# Patient Record
Sex: MALE | Race: WHITE | Employment: STUDENT | URBAN - METROPOLITAN AREA
[De-identification: names, ages, dates, MRNs, and addresses within clinical notes are randomized per-mention and may not be internally consistent; named-entity substitution may affect disease eponyms.]

---

## 2019-05-25 ENCOUNTER — HOSPITAL ENCOUNTER (EMERGENCY)
Age: 19
Discharge: HOME | End: 2019-05-26
Attending: PEDIATRICS | Admitting: PEDIATRICS
Payer: COMMERCIAL

## 2019-05-25 ENCOUNTER — APPOINTMENT (OUTPATIENT)
Dept: CT IMAGING | Age: 19
End: 2019-05-25
Attending: PEDIATRICS
Payer: COMMERCIAL

## 2019-05-25 DIAGNOSIS — R50.9 FEVER, UNSPECIFIED FEVER CAUSE: ICD-10-CM

## 2019-05-25 DIAGNOSIS — R11.10 VOMITING, INTRACTABILITY OF VOMITING NOT SPECIFIED, PRESENCE OF NAUSEA NOT SPECIFIED, UNSPECIFIED VOMITING TYPE: ICD-10-CM

## 2019-05-25 DIAGNOSIS — R10.31 ABDOMINAL PAIN, RIGHT LOWER QUADRANT: Primary | ICD-10-CM

## 2019-05-25 LAB
ALBUMIN SERPL-MCNC: 4.4 G/DL (ref 3.5–5)
ALBUMIN/GLOB SERPL: 1.2 {RATIO} (ref 1.1–2.2)
ALP SERPL-CCNC: 124 U/L (ref 45–117)
ALT SERPL-CCNC: 53 U/L (ref 12–78)
AMPHET UR QL SCN: NEGATIVE
ANION GAP SERPL CALC-SCNC: 7 MMOL/L (ref 5–15)
APPEARANCE UR: CLEAR
AST SERPL-CCNC: 26 U/L (ref 15–37)
BACTERIA URNS QL MICRO: NEGATIVE /HPF
BARBITURATES UR QL SCN: NEGATIVE
BASOPHILS # BLD: 0 K/UL (ref 0–0.1)
BASOPHILS NFR BLD: 0 % (ref 0–1)
BENZODIAZ UR QL: NEGATIVE
BILIRUB SERPL-MCNC: 0.6 MG/DL (ref 0.2–1)
BILIRUB UR QL: NEGATIVE
BUN SERPL-MCNC: 14 MG/DL (ref 6–20)
BUN/CREAT SERPL: 13 (ref 12–20)
CALCIUM SERPL-MCNC: 9.4 MG/DL (ref 8.5–10.1)
CANNABINOIDS UR QL SCN: NEGATIVE
CHLORIDE SERPL-SCNC: 107 MMOL/L (ref 97–108)
CO2 SERPL-SCNC: 24 MMOL/L (ref 21–32)
COCAINE UR QL SCN: NEGATIVE
COLOR UR: NORMAL
COMMENT, HOLDF: NORMAL
CREAT SERPL-MCNC: 1.06 MG/DL (ref 0.7–1.3)
CRP SERPL-MCNC: <0.29 MG/DL (ref 0–0.6)
DIFFERENTIAL METHOD BLD: ABNORMAL
DRUG SCRN COMMENT,DRGCM: ABNORMAL
EOSINOPHIL # BLD: 0 K/UL (ref 0–0.4)
EOSINOPHIL NFR BLD: 0 % (ref 0–7)
EPITH CASTS URNS QL MICRO: NORMAL /LPF
ERYTHROCYTE [DISTWIDTH] IN BLOOD BY AUTOMATED COUNT: 14 % (ref 11.5–14.5)
GLOBULIN SER CALC-MCNC: 3.8 G/DL (ref 2–4)
GLUCOSE SERPL-MCNC: 100 MG/DL (ref 65–100)
GLUCOSE UR STRIP.AUTO-MCNC: NEGATIVE MG/DL
HCT VFR BLD AUTO: 48.8 % (ref 36.6–50.3)
HGB BLD-MCNC: 15.6 G/DL (ref 12.1–17)
HGB UR QL STRIP: NEGATIVE
HYALINE CASTS URNS QL MICRO: NORMAL /LPF (ref 0–5)
IMM GRANULOCYTES # BLD AUTO: 0 K/UL
IMM GRANULOCYTES NFR BLD AUTO: 0 %
KETONES UR QL STRIP.AUTO: NEGATIVE MG/DL
LEUKOCYTE ESTERASE UR QL STRIP.AUTO: NEGATIVE
LIPASE SERPL-CCNC: 159 U/L (ref 73–393)
LYMPHOCYTES # BLD: 0.4 K/UL (ref 0.8–3.5)
LYMPHOCYTES NFR BLD: 3 % (ref 12–49)
MCH RBC QN AUTO: 28.5 PG (ref 26–34)
MCHC RBC AUTO-ENTMCNC: 32 G/DL (ref 30–36.5)
MCV RBC AUTO: 89.1 FL (ref 80–99)
METHADONE UR QL: NEGATIVE
MONOCYTES # BLD: 0.4 K/UL (ref 0–1)
MONOCYTES NFR BLD: 3 % (ref 5–13)
NEUTS BAND NFR BLD MANUAL: 2 % (ref 0–6)
NEUTS SEG # BLD: 11.4 K/UL (ref 1.8–8)
NEUTS SEG NFR BLD: 92 % (ref 32–75)
NITRITE UR QL STRIP.AUTO: NEGATIVE
NRBC # BLD: 0 K/UL (ref 0–0.01)
NRBC BLD-RTO: 0 PER 100 WBC
OPIATES UR QL: POSITIVE
PCP UR QL: NEGATIVE
PH UR STRIP: 5 [PH] (ref 5–8)
PLATELET # BLD AUTO: 245 K/UL (ref 150–400)
PMV BLD AUTO: 8.8 FL (ref 8.9–12.9)
POTASSIUM SERPL-SCNC: 3.9 MMOL/L (ref 3.5–5.1)
PROT SERPL-MCNC: 8.2 G/DL (ref 6.4–8.2)
PROT UR STRIP-MCNC: NEGATIVE MG/DL
RBC # BLD AUTO: 5.48 M/UL (ref 4.1–5.7)
RBC #/AREA URNS HPF: NORMAL /HPF (ref 0–5)
RBC MORPH BLD: ABNORMAL
SAMPLES BEING HELD,HOLD: NORMAL
SODIUM SERPL-SCNC: 138 MMOL/L (ref 136–145)
SP GR UR REFRACTOMETRY: 1.02 (ref 1–1.03)
UR CULT HOLD, URHOLD: NORMAL
UROBILINOGEN UR QL STRIP.AUTO: 0.2 EU/DL (ref 0.2–1)
WBC # BLD AUTO: 12.2 K/UL (ref 4.1–11.1)
WBC URNS QL MICRO: NORMAL /HPF (ref 0–4)

## 2019-05-25 PROCEDURE — 96361 HYDRATE IV INFUSION ADD-ON: CPT

## 2019-05-25 PROCEDURE — 74011250637 HC RX REV CODE- 250/637

## 2019-05-25 PROCEDURE — 74011636320 HC RX REV CODE- 636/320: Performed by: RADIOLOGY

## 2019-05-25 PROCEDURE — 74011000258 HC RX REV CODE- 258: Performed by: RADIOLOGY

## 2019-05-25 PROCEDURE — 80307 DRUG TEST PRSMV CHEM ANLYZR: CPT

## 2019-05-25 PROCEDURE — 86140 C-REACTIVE PROTEIN: CPT

## 2019-05-25 PROCEDURE — 83690 ASSAY OF LIPASE: CPT

## 2019-05-25 PROCEDURE — 74177 CT ABD & PELVIS W/CONTRAST: CPT

## 2019-05-25 PROCEDURE — 85025 COMPLETE CBC W/AUTO DIFF WBC: CPT

## 2019-05-25 PROCEDURE — 36415 COLL VENOUS BLD VENIPUNCTURE: CPT

## 2019-05-25 PROCEDURE — 81001 URINALYSIS AUTO W/SCOPE: CPT

## 2019-05-25 PROCEDURE — 99284 EMERGENCY DEPT VISIT MOD MDM: CPT

## 2019-05-25 PROCEDURE — 80053 COMPREHEN METABOLIC PANEL: CPT

## 2019-05-25 PROCEDURE — 96374 THER/PROPH/DIAG INJ IV PUSH: CPT

## 2019-05-25 PROCEDURE — 96375 TX/PRO/DX INJ NEW DRUG ADDON: CPT

## 2019-05-25 PROCEDURE — 74011250636 HC RX REV CODE- 250/636: Performed by: PEDIATRICS

## 2019-05-25 RX ORDER — MORPHINE SULFATE 2 MG/ML
4 INJECTION, SOLUTION INTRAMUSCULAR; INTRAVENOUS
Status: COMPLETED | OUTPATIENT
Start: 2019-05-25 | End: 2019-05-25

## 2019-05-25 RX ORDER — ACETAMINOPHEN 500 MG
TABLET ORAL
Status: COMPLETED
Start: 2019-05-25 | End: 2019-05-25

## 2019-05-25 RX ORDER — ACETAMINOPHEN 500 MG
1000 TABLET ORAL
Status: COMPLETED | OUTPATIENT
Start: 2019-05-26 | End: 2019-05-25

## 2019-05-25 RX ORDER — ONDANSETRON 2 MG/ML
8 INJECTION INTRAMUSCULAR; INTRAVENOUS
Status: COMPLETED | OUTPATIENT
Start: 2019-05-25 | End: 2019-05-25

## 2019-05-25 RX ORDER — SODIUM CHLORIDE 0.9 % (FLUSH) 0.9 %
10 SYRINGE (ML) INJECTION
Status: COMPLETED | OUTPATIENT
Start: 2019-05-25 | End: 2019-05-25

## 2019-05-25 RX ADMIN — ACETAMINOPHEN 1000 MG: 500 TABLET ORAL at 23:48

## 2019-05-25 RX ADMIN — SODIUM CHLORIDE 1000 ML: 900 INJECTION, SOLUTION INTRAVENOUS at 22:31

## 2019-05-25 RX ADMIN — SODIUM CHLORIDE 100 ML: 900 INJECTION, SOLUTION INTRAVENOUS at 21:48

## 2019-05-25 RX ADMIN — MORPHINE SULFATE 4 MG: 2 INJECTION, SOLUTION INTRAMUSCULAR; INTRAVENOUS at 20:30

## 2019-05-25 RX ADMIN — Medication 10 ML: at 21:48

## 2019-05-25 RX ADMIN — SODIUM CHLORIDE 1000 ML: 900 INJECTION, SOLUTION INTRAVENOUS at 20:17

## 2019-05-25 RX ADMIN — ONDANSETRON 8 MG: 2 INJECTION INTRAMUSCULAR; INTRAVENOUS at 20:17

## 2019-05-25 RX ADMIN — Medication 1000 MG: at 23:48

## 2019-05-25 RX ADMIN — IOPAMIDOL 100 ML: 755 INJECTION, SOLUTION INTRAVENOUS at 21:48

## 2019-05-26 VITALS
BODY MASS INDEX: 25.57 KG/M2 | OXYGEN SATURATION: 100 % | RESPIRATION RATE: 18 BRPM | TEMPERATURE: 99.5 F | SYSTOLIC BLOOD PRESSURE: 125 MMHG | DIASTOLIC BLOOD PRESSURE: 51 MMHG | HEART RATE: 86 BPM | WEIGHT: 172.62 LBS | HEIGHT: 69 IN

## 2019-05-26 LAB
COMMENT, HOLDF: NORMAL
LACTATE SERPL-SCNC: 1.4 MMOL/L (ref 0.4–2)
SAMPLES BEING HELD,HOLD: NORMAL

## 2019-05-26 PROCEDURE — 87040 BLOOD CULTURE FOR BACTERIA: CPT

## 2019-05-26 PROCEDURE — 83605 ASSAY OF LACTIC ACID: CPT

## 2019-05-26 PROCEDURE — 36415 COLL VENOUS BLD VENIPUNCTURE: CPT

## 2019-05-26 RX ORDER — IBUPROFEN 600 MG/1
600 TABLET ORAL
Qty: 20 TAB | Refills: 0 | Status: SHIPPED | OUTPATIENT
Start: 2019-05-26

## 2019-05-26 RX ORDER — ONDANSETRON 8 MG/1
8 TABLET, ORALLY DISINTEGRATING ORAL
Qty: 6 TAB | Refills: 0 | Status: SHIPPED | OUTPATIENT
Start: 2019-05-26

## 2019-05-26 RX ORDER — ACETAMINOPHEN 325 MG/1
650 TABLET ORAL
Qty: 20 TAB | Refills: 0 | Status: SHIPPED | OUTPATIENT
Start: 2019-05-26

## 2019-05-26 NOTE — ED NOTES
Pt resting comfortably on stretcher. Skin pink, dry and warm. No vomiting.  No complaints at this time

## 2019-05-26 NOTE — ED PROVIDER NOTES
HPI   Decision-making:    Patient is a 13-year-old male who presents to the emergency department with right lower quadrant abdominal pain and vomiting. Patient states he was in his usual state of good health has been on vacation with friends in [place] and currently driving back to [place]. He states beginning this morning at 8 AM he has had vomiting with increased frequency over the last 3-4 hours. Last oral intake was at 3 PM positive anorexia no multiple emesis too numerous to count nonbloody nonbilious. Started with right lower quadrant abdominal pain which is increased in intensity over the last 6 hours. No known fevers. No headache no sore throat no cough no chest pain. No dysuria or testicular pain. No penile discharges no hematuria. No trauma. Of note patient states she was diagnosed with appendicitis 3 years earlier and treated with antibiotics the appendix was never removed. He was told that should his pain returned appendectomy with be required. No other complaints no modifying factors no other concerns    Review of systems: Complete 10point  review was conducted. All pertinent positives and negatives are as stated in history of present illness  Allergies: Penicillins  Immunizations: Up to date  Medications: Claritin  Past medical history: Unremarkable except as described above  Family history: Noncontributory to this visit  Social history: Attends college. Denies smoking or drug use. Positive alcohol use      History reviewed. No pertinent past medical history. Past Surgical History:   Procedure Laterality Date    HX ACL RECONSTRUCTION Left          History reviewed. No pertinent family history.     Social History     Socioeconomic History    Marital status: SINGLE     Spouse name: Not on file    Number of children: Not on file    Years of education: Not on file    Highest education level: Not on file   Occupational History    Not on file   Social Needs    Financial resource strain: Not on file    Food insecurity:     Worry: Not on file     Inability: Not on file    Transportation needs:     Medical: Not on file     Non-medical: Not on file   Tobacco Use    Smoking status: Never Smoker    Smokeless tobacco: Never Used   Substance and Sexual Activity    Alcohol use: Yes    Drug use: Not Currently    Sexual activity: Not on file   Lifestyle    Physical activity:     Days per week: Not on file     Minutes per session: Not on file    Stress: Not on file   Relationships    Social connections:     Talks on phone: Not on file     Gets together: Not on file     Attends [employer] service: Not on file     Active member of club or organization: Not on file     Attends meetings of clubs or organizations: Not on file     Relationship status: Not on file    Intimate partner violence:     Fear of current or ex partner: Not on file     Emotionally abused: Not on file     Physically abused: Not on file     Forced sexual activity: Not on file   Other Topics Concern    Not on file   Social History Narrative    Not on file         ALLERGIES: Penicillins    Review of Systems   Constitutional: Positive for appetite change and fever. Negative for activity change. HENT: Negative for ear pain and sore throat. Eyes: Negative for pain, discharge, redness and visual disturbance. Respiratory: Negative for cough and shortness of breath. Cardiovascular: Negative for chest pain. Gastrointestinal: Positive for abdominal pain, nausea and vomiting. Negative for blood in stool, constipation and diarrhea. Genitourinary: Negative for decreased urine volume and dysuria. Musculoskeletal: Negative for gait problem and neck pain. Skin: Negative for rash. Neurological: Negative for dizziness and weakness. All other systems reviewed and are negative.       Vitals:    05/25/19 2001   BP: 128/69   Pulse: 98   Resp: 18   Temp: 98.5 °F (36.9 °C)   SpO2: 100%   Weight: 78.3 kg (172 lb 9.9 oz)   Height: 5' 9\" (1.753 m)            Physical Exam   Nursing note and vitals reviewed. PE:  GEN:  WDWN male alert non toxic in NAD actively vomiting  SK: CRT < 2 sec, good distal pulses. No lesions, no rashes, no petechiae  HEENT: H: AT/NC. E: EOMI , PERRL, E: TM clear  N/T: Clear oropharynx  NECK: supple, no meningismus. No pain on palpation  Chest: Clear to auscultation, clear BS. NO rales, rhonchi, wheezes or distress. No   Retraction. Chest Wall: no tenderness on palpation  CV: Regular rate and rhythm. Normal S1 S2 . No murmur, gallops or thrills  ABD: Soft + tenderness in RLQ no hepatomegaly, good bowel sound, +guarding, No masses,  MS: FROM all extremities, no long bone tenderness. No swelling, cyanosis, no edema. Good distal pulses. Gait normal  NEURO: Alert. No focality. Cranial nerves 2-12 grossly intact. GCS 15  Behavior and mentation appropriate for age           MDM  Number of Diagnoses or Management Options  Abdominal pain, right lower quadrant:   Fever, unspecified fever cause:   Vomiting, intractability of vomiting not specified, presence of nausea not specified, unspecified vomiting type:   Diagnosis management comments: Medical decision making:    Differential diagnosis includes: Appendicitis, acute gastroenteritis, UTI renal stone    Patient with history of appendicitis 3 years earlier treated with antibiotics only  CBC: CBC 12.2 hemoglobin 15.6 normal platelets differential 92 segs 2 bands 3 lymphs  Urinalysis: Unremarkable  CMP: Sodium 138 potassium 3.9 chloride 107 bicarbonate 24 glucose 100 BUN 14 creatinine 1.06 ALT 53 AST 26 lipase 159  CRP: Less than 0.29  Urine drug screen: Positive for opiates-patient received morphine in the ER prior to this being performed  CT abdomen pelvis: No acute process appendix unremarkable    Patient received 4 mg of morphine on arrival secondary to abdominal pain 8 mg of Zofran and normal saline bolus.  On repeat exam he is markedly improved states she feels much better  Attempt at p.o. challenge cells complaints of nausea    Spiked temperature to 101.2 Tylenol given    Second normal saline bolus administered  Lactate pending    Patient has drank 20 ounces of Gatorade tea and crackers P. fall in a sling. While waiting for her mother to calm the patient    Patient sent over to Dr. Marquis Shea at 0000. To check lactate and reevaluate the patient prior to discharge    Patient's results have been reviewed with them. Patient and /or family have verbally conveyed understanding and agreement of the patient's signs, symptoms, diagnosis, treatment and prognosis and additionally agree to follow up as recommended or return to the Emergency Department should their condition change prior to follow-up. Discharge instructions have also been provided to the patient with some educational information regarding their diagnosis as well as a list of reasons why they would want to return to the ER prior to their follow-up appointment should their condition change.     Clinical impression:  Abdominal pain  Vomiting  Fever       Amount and/or Complexity of Data Reviewed  Clinical lab tests: ordered and reviewed  Tests in the radiology section of CPT®: ordered and reviewed  Independent visualization of images, tracings, or specimens: yes           Procedures

## 2019-05-26 NOTE — ED NOTES
Pt sleeping on stretcher. Skin pink, dry and warm. Abdomen soft and non tender. Bowel sounds active.

## 2019-05-26 NOTE — DISCHARGE INSTRUCTIONS
Follow up with your physician in 1 day for re-evaluation. Return to the emergency department for any worsening symptoms, any trouble breathing, fevers lasting longer than 3 days, persistent vomiting, return of pain or other new concerns. Abdominal Pain: Care Instructions  Your Care Instructions    Abdominal pain has many possible causes. Some aren't serious and get better on their own in a few days. Others need more testing and treatment. If your pain continues or gets worse, you need to be rechecked and may need more tests to find out what is wrong. You may need surgery to correct the problem. Don't ignore new symptoms, such as fever, nausea and vomiting, urination problems, pain that gets worse, and dizziness. These may be signs of a more serious problem. Your doctor may have recommended a follow-up visit in the next 8 to 12 hours. If you are not getting better, you may need more tests or treatment. The doctor has checked you carefully, but problems can develop later. If you notice any problems or new symptoms, get medical treatment right away. Follow-up care is a key part of your treatment and safety. Be sure to make and go to all appointments, and call your doctor if you are having problems. It's also a good idea to know your test results and keep a list of the medicines you take. How can you care for yourself at home? · Rest until you feel better. · To prevent dehydration, drink plenty of fluids, enough so that your urine is light yellow or clear like water. Choose water and other caffeine-free clear liquids until you feel better. If you have kidney, heart, or liver disease and have to limit fluids, talk with your doctor before you increase the amount of fluids you drink. · If your stomach is upset, eat mild foods, such as rice, dry toast or crackers, bananas, and applesauce. Try eating several small meals instead of two or three large ones.   · Wait until 48 hours after all symptoms have gone away before you have spicy foods, alcohol, and drinks that contain caffeine. · Do not eat foods that are high in fat. · Avoid anti-inflammatory medicines such as aspirin, ibuprofen (Advil, Motrin), and naproxen (Aleve). These can cause stomach upset. Talk to your doctor if you take daily aspirin for another health problem. When should you call for help? Call 911 anytime you think you may need emergency care. For example, call if:    · You passed out (lost consciousness).     · You pass maroon or very bloody stools.     · You vomit blood or what looks like coffee grounds.     · You have new, severe belly pain.    Call your doctor now or seek immediate medical care if:    · Your pain gets worse, especially if it becomes focused in one area of your belly.     · You have a new or higher fever.     · Your stools are black and look like tar, or they have streaks of blood.     · You have unexpected vaginal bleeding.     · You have symptoms of a urinary tract infection. These may include:  ? Pain when you urinate. ? Urinating more often than usual.  ? Blood in your urine.     · You are dizzy or lightheaded, or you feel like you may faint.    Watch closely for changes in your health, and be sure to contact your doctor if:    · You are not getting better after 1 day (24 hours). Where can you learn more? Go to http://hallie-darniel.info/. Enter C485 in the search box to learn more about \"Abdominal Pain: Care Instructions. \"  Current as of: September 23, 2018  Content Version: 11.9  © 8158-8682 [employer]. Care instructions adapted under license by [employer] (which disclaims liability or warranty for this information). If you have questions about a medical condition or this instruction, always ask your healthcare professional. [place] [address] any warranty or liability for your use of this information.          Patient Education        Nausea and Vomiting: Care Instructions  Your Care Instructions    When you are nauseated, you may feel weak and sweaty and notice a lot of saliva in your mouth. Nausea often leads to vomiting. Most of the time you do not need to worry about nausea and vomiting, but they can be signs of other illnesses. Two common causes of nausea and vomiting are stomach flu and food poisoning. Nausea and vomiting from viral stomach flu will usually start to improve within 24 hours. Nausea and vomiting from food poisoning may last from 12 to 48 hours. The doctor has checked you carefully, but problems can develop later. If you notice any problems or new symptoms, get medical treatment right away. Follow-up care is a key part of your treatment and safety. Be sure to make and go to all appointments, and call your doctor if you are having problems. It's also a good idea to know your test results and keep a list of the medicines you take. How can you care for yourself at home? · To prevent dehydration, drink plenty of fluids, enough so that your urine is light yellow or clear like water. Choose water and other caffeine-free clear liquids until you feel better. If you have kidney, heart, or liver disease and have to limit fluids, talk with your doctor before you increase the amount of fluids you drink. · Rest in bed until you feel better. · When you are able to eat, try clear soups, mild foods, and liquids until all symptoms are gone for 12 to 48 hours. Other good choices include dry toast, crackers, cooked cereal, and gelatin dessert, such as Jell-O. When should you call for help? Call 911 anytime you think you may need emergency care. For example, call if:    · You passed out (lost consciousness).    Call your doctor now or seek immediate medical care if:    · You have symptoms of dehydration, such as:  ? Dry eyes and a dry mouth. ? Passing only a little dark urine. ?  Feeling thirstier than usual.     · You have new or worsening belly pain.     · You have a new or higher fever.     · You vomit blood or what looks like coffee grounds.    Watch closely for changes in your health, and be sure to contact your doctor if:    · You have ongoing nausea and vomiting.     · Your vomiting is getting worse.     · Your vomiting lasts longer than 2 days.     · You are not getting better as expected. Where can you learn more? Go to http://hallie-darinel.info/. Enter 25 [id number] in the search box to learn more about \"Nausea and Vomiting: Care Instructions. \"  Current as of: September 23, 2018  Content Version: 11.9  © 6913-9569 [employer]. Care instructions adapted under license by [employer] (which disclaims liability or warranty for this information). If you have questions about a medical condition or this instruction, always ask your healthcare professional. [address] any warranty or liability for your use of this information. Patient Education        Learning About Fever  What is a fever? A fever is a high body temperature. It's one way your body fights being sick. A fever shows that the body is responding to infection or other illnesses, both minor and severe. A fever is a symptom, not an illness by itself. A fever can be a sign that you are ill, but most fevers are not caused by a serious problem. You may have a fever with a minor illness, such as a cold. But sometimes a very serious infection may cause little or no fever. It is important to look at other symptoms, other conditions you have, and how you feel in general. In children, notice how they act and see what symptoms they complain of. What is a normal body temperature? A normal body temperature is about 98. 6ºF. Some people have a normal temperature that is a little higher or a little lower than this. Your temperature may be a little lower in the morning than it is later in the day.  It may go up during hot weather or when you exercise, wear heavy clothes, or take a hot bath. Your temperature may also be different depending on how you take it. A temperature taken in the mouth (oral) or under the arm may be a little lower than your core temperature (rectal). What is a fever temperature? A core temperature of 100.4°F or above is considered a fever. What can cause a fever? A fever may be caused by:  · Infections. This is the most common cause of a fever. Examples of infections that can cause a fever include the flu, a kidney infection, or pneumonia. · Some medicines. · Severe trauma or injury, such as a heart attack, stroke, heatstroke, or burns. · Other medical conditions, such as arthritis and some cancers. How can you treat a fever at home? · Ask your doctor if you can take an over-the-counter pain medicine, such as acetaminophen (Tylenol), ibuprofen (Advil, Motrin), or naproxen (Aleve). Be safe with medicines. Read and follow all instructions on the label. · To prevent dehydration, drink plenty of fluids. Choose water and other caffeine-free clear liquids until you feel better. If you have kidney, heart, or liver disease and have to limit fluids, talk with your doctor before you increase the amount of fluids you drink. Follow-up care is a key part of your treatment and safety. Be sure to make and go to all appointments, and call your doctor if you are having problems. It's also a good idea to know your test results and keep a list of the medicines you take. Where can you learn more? Go to http://hallie-darinel.info/. Enter K715 in the search box to learn more about \"Learning About Fever. \"  Current as of: September 23, 2018  Content Version: 11.9  © 4623-4470 [employer]. Care instructions adapted under license by [employer] (which disclaims liability or warranty for this information).  If you have questions about a medical condition or this instruction, always ask your healthcare professional. [place] [address] any warranty or liability for your use of this information. We hope that we have addressed all of your medical concerns. The examination and treatment you received in the Emergency Department were for an emergent problem and were not intended as complete care. It is important that you follow up with your healthcare provider(s) for ongoing care. If your symptoms worsen or do not improve as expected, and you are unable to reach your usual health care provider(s), you should return to the Emergency Department. Today's healthcare is undergoing tremendous change, and patient satisfaction surveys are one of the many tools to assess the quality of medical care. You may receive a survey from the [employer] regarding your experience in the Emergency Department. I hope that your experience has been completely positive, particularly the medical care that I provided. As such, please participate in the survey; anything less than excellent does not meet my expectations or intentions. [facility] and [address] [place] [address] participate in nationally recognized quality of care measures. If your blood pressure is greater than 120/80, as reported below, we urge that you seek medical care to address the potential of high blood pressure, commonly known as hypertension. Hypertension can be hereditary or can be caused by certain medical conditions, pain, stress, or \"white coat syndrome. \"       Please make an appointment with your health care provider(s) for follow up of your Emergency Department visit. VITALS:   Patient Vitals for the past 8 hrs:   Temp Pulse Resp BP SpO2   05/25/19 2341 (!) 101.2 °F (38.4 °C) (!) 104 18 125/51 98 %   05/25/19 2001 98.5 °F (36.9 °C) 98 18 128/69 100 %          Thank you for allowing us to provide you with medical care today.   We realize that you have many choices for your emergency care needs. Please choose us in the future for any continued health care needs. Dell Huang MD    [address] [facility].   Office: [phone]            Recent Results (from the past 24 hour(s))   SAMPLES BEING HELD    Collection Time: 05/25/19  8:17 PM   Result Value Ref Range    SAMPLES BEING HELD 2RED, 2LAV, 2PST     COMMENT        Add-on orders for these samples will be processed based on acceptable specimen integrity and analyte stability, which may vary by analyte. C REACTIVE PROTEIN, QT    Collection Time: 05/25/19  8:21 PM   Result Value Ref Range    C-Reactive protein <0.29 0.00 - 0.60 mg/dL   CBC WITH AUTOMATED DIFF    Collection Time: 05/25/19  8:21 PM   Result Value Ref Range    WBC 12.2 (H) 4.1 - 11.1 K/uL    RBC 5.48 4.10 - 5.70 M/uL    HGB 15.6 12.1 - 17.0 g/dL    HCT 48.8 36.6 - 50.3 %    MCV 89.1 80.0 - 99.0 FL    MCH 28.5 26.0 - 34.0 PG    MCHC 32.0 30.0 - 36.5 g/dL    RDW 14.0 11.5 - 14.5 %    PLATELET 877 752 - 239 K/uL    MPV 8.8 (L) 8.9 - 12.9 FL    NRBC 0.0 0  WBC    ABSOLUTE NRBC 0.00 0.00 - 0.01 K/uL    NEUTROPHILS 92 (H) 32 - 75 %    BAND NEUTROPHILS 2 0 - 6 %    LYMPHOCYTES 3 (L) 12 - 49 %    MONOCYTES 3 (L) 5 - 13 %    EOSINOPHILS 0 0 - 7 %    BASOPHILS 0 0 - 1 %    IMMATURE GRANULOCYTES 0 %    ABS. NEUTROPHILS 11.4 (H) 1.8 - 8.0 K/UL    ABS. LYMPHOCYTES 0.4 (L) 0.8 - 3.5 K/UL    ABS. MONOCYTES 0.4 0.0 - 1.0 K/UL    ABS. EOSINOPHILS 0.0 0.0 - 0.4 K/UL    ABS. BASOPHILS 0.0 0.0 - 0.1 K/UL    ABS. IMM.  GRANS. 0.0 K/UL    DF MANUAL      RBC COMMENTS NORMOCYTIC, NORMOCHROMIC     LIPASE    Collection Time: 05/25/19  8:21 PM   Result Value Ref Range    Lipase 159 73 - 147 U/L   METABOLIC PANEL, COMPREHENSIVE    Collection Time: 05/25/19  8:21 PM   Result Value Ref Range    Sodium 138 136 - 145 mmol/L    Potassium 3.9 3.5 - 5.1 mmol/L    Chloride 107 97 - 108 mmol/L    CO2 24 21 - 32 mmol/L    Anion gap 7 5 - 15 mmol/L    Glucose 100 65 - 100 mg/dL    BUN 14 6 - 20 MG/DL    Creatinine 1.06 0.70 - 1.30 MG/DL    BUN/Creatinine ratio 13 12 - 20      GFR est AA >60 >60 ml/min/1.73m2    GFR est non-AA >60 >60 ml/min/1.73m2    Calcium 9.4 8.5 - 10.1 MG/DL    Bilirubin, total 0.6 0.2 - 1.0 MG/DL    ALT (SGPT) 53 12 - 78 U/L    AST (SGOT) 26 15 - 37 U/L    Alk. phosphatase 124 (H) 45 - 117 U/L    Protein, total 8.2 6.4 - 8.2 g/dL    Albumin 4.4 3.5 - 5.0 g/dL    Globulin 3.8 2.0 - 4.0 g/dL    A-G Ratio 1.2 1.1 - 2.2     URINALYSIS W/MICROSCOPIC    Collection Time: 05/25/19 10:00 PM   Result Value Ref Range    Color YELLOW/STRAW      Appearance CLEAR CLEAR      Specific gravity 1.020 1.003 - 1.030      pH (UA) 5.0 5.0 - 8.0      Protein NEGATIVE  NEG mg/dL    Glucose NEGATIVE  NEG mg/dL    Ketone NEGATIVE  NEG mg/dL    Bilirubin NEGATIVE  NEG      Blood NEGATIVE  NEG      Urobilinogen 0.2 0.2 - 1.0 EU/dL    Nitrites NEGATIVE  NEG      Leukocyte Esterase NEGATIVE  NEG      WBC 0-4 0 - 4 /hpf    RBC 0-5 0 - 5 /hpf    Epithelial cells FEW FEW /lpf    Bacteria NEGATIVE  NEG /hpf    Hyaline cast 0-2 0 - 5 /lpf   URINE CULTURE HOLD SAMPLE    Collection Time: 05/25/19 10:00 PM   Result Value Ref Range    Urine culture hold        URINE ON HOLD IN MICROBIOLOGY DEPT FOR 3 DAYS. IF UNPRESERVED URINE IS SUBMITTED, IT CANNOT BE USED FOR ADDITIONAL TESTING AFTER 24 HRS, RECOLLECTION WILL BE REQUIRED.    DRUG SCREEN, URINE    Collection Time: 05/25/19 10:00 PM   Result Value Ref Range    AMPHETAMINES NEGATIVE  NEG      BARBITURATES NEGATIVE  NEG      BENZODIAZEPINES NEGATIVE  NEG      COCAINE NEGATIVE  NEG      METHADONE NEGATIVE  NEG      OPIATES POSITIVE (A) NEG      PCP(PHENCYCLIDINE) NEGATIVE  NEG      THC (TH-CANNABINOL) NEGATIVE  NEG      Drug screen comment (NOTE)    SAMPLES BEING HELD    Collection Time: 05/26/19 12:16 AM   Result Value Ref Range    SAMPLES BEING HELD 1RED 1BLUE     COMMENT        Add-on orders for these samples will be processed based on acceptable specimen integrity and analyte stability, which may vary by analyte. Ct Abd Pelv W Cont    Result Date: 5/25/2019  EXAM: CT ABD PELV W CONT INDICATION: RLQ pain, appendicitis suspect, fever, elev WBC, classic presentation; ? appy COMPARISON: None CONTRAST: 100 mL of Isovue-370. TECHNIQUE: Following the uneventful intravenous administration of contrast, thin axial images were obtained through the abdomen and pelvis. Coronal and sagittal reconstructions were generated. Oral contrast was not administered. CT dose reduction was achieved through use of a standardized protocol tailored for this examination and automatic exposure control for dose modulation. FINDINGS: LUNG BASES: Clear. INCIDENTALLY IMAGED HEART AND MEDIASTINUM: Unremarkable. LIVER: No mass or biliary dilatation. GALLBLADDER: Unremarkable. SPLEEN: No mass. PANCREAS: No mass or ductal dilatation. ADRENALS: Unremarkable. KIDNEYS: No mass, calculus, or hydronephrosis. STOMACH: Unremarkable. SMALL BOWEL: No dilatation or wall thickening. COLON: No dilatation or wall thickening. APPENDIX: Unremarkable. PERITONEUM: No ascites or pneumoperitoneum. RETROPERITONEUM: No lymphadenopathy or aortic aneurysm. REPRODUCTIVE ORGANS: Unremarkable URINARY BLADDER: No mass or calculus. BONES: No destructive bone lesion. ADDITIONAL COMMENTS: N/A     No evidence of acute process.

## 2019-05-26 NOTE — ED NOTES
Pt endorsed to me by Dr. Wendy Orellana    Patient with Abd pain, fever, vomit. Pain improved and labs reassuring. At this time Physical exam is reassuring, and without signs of serious illness. Given the patient's history, clinical course, physical exam, lab and imaging findings, abdominal pain is unlikely secondary to a surgical etiology. Lactate is also less than 2, BCx pending. Father here and updated. Patient will be discharged home with pain control and follow-up with primary care physician in one to two days. Patient and caregivers were instructed on signs and symptoms of reasons to return including fever, worsening pain, vomiting, blood in the stool or any other concerns. ICD-10-CM ICD-9-CM   1. Abdominal pain, right lower quadrant R10.31 789.03   2. Vomiting, intractability of vomiting not specified, presence of nausea not specified, unspecified vomiting type R11.10 787.03   3. Fever, unspecified fever cause R50.9 780.60       Current Discharge Medication List      START taking these medications    Details   ondansetron (ZOFRAN ODT) 8 mg disintegrating tablet Take 1 Tab by mouth every eight (8) hours as needed for Nausea (or vomiting). Qty: 6 Tab, Refills: 0      ibuprofen (MOTRIN) 600 mg tablet Take 1 Tab by mouth every six (6) hours as needed (fever). Qty: 20 Tab, Refills: 0      acetaminophen (TYLENOL) 325 mg tablet Take 2 Tabs by mouth every four (4) hours as needed for Pain. Qty: 20 Tab, Refills: 0             Follow-up Information     Follow up With Specialties Details Why Contact Info    [facility], Not On File  In 1 day  Not On File (62) Patient has a PCP but that physician is not listed in [facility]. [facility] [place] [facility]IC EMR DEPT Pediatric Emergency Medicine  If symptoms worsen as described above Clem[place]  [phone]          I have reviewed discharge instructions with the patient and parent. The patient and parent verbalized understanding.     3:13 AM  Leopoldo Ibrahim Rosanne Omer M.D.

## 2019-05-26 NOTE — ED NOTES
Pt resting comfortably on stretcher. Skin pink, dry and warm. Abdomen soft. No complaints at this time. Pt education completed on plan of care with more bloodwork needing to be ran due to fever. Will continue to monitor patient while waiting for parents to arrive from [place].

## 2019-05-26 NOTE — ED NOTES
2135 Pt transported to CT.    2153 Pt reports relief in nausea and pain symptoms. Pt ambulatory to restroom for urine specimen. 2233 Pt reporting some nausea. MD notified.

## 2019-05-31 LAB
BACTERIA SPEC CULT: NORMAL
SERVICE CMNT-IMP: NORMAL